# Patient Record
Sex: MALE | Race: WHITE
[De-identification: names, ages, dates, MRNs, and addresses within clinical notes are randomized per-mention and may not be internally consistent; named-entity substitution may affect disease eponyms.]

---

## 2018-10-04 ENCOUNTER — HOSPITAL ENCOUNTER (EMERGENCY)
Dept: HOSPITAL 62 - ER | Age: 33
LOS: 1 days | Discharge: LEFT BEFORE DISCHARGE | End: 2018-10-05
Payer: SELF-PAY

## 2018-10-04 VITALS — SYSTOLIC BLOOD PRESSURE: 140 MMHG | DIASTOLIC BLOOD PRESSURE: 83 MMHG

## 2018-10-04 DIAGNOSIS — F91.1: ICD-10-CM

## 2018-10-04 DIAGNOSIS — F19.10: ICD-10-CM
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
## 2018-10-04 DIAGNOSIS — F10.920: Primary | ICD-10-CM

## 2018-10-04 DIAGNOSIS — Z86.14: ICD-10-CM

## 2018-10-04 DIAGNOSIS — F17.200: ICD-10-CM

## 2018-10-04 PROCEDURE — 80307 DRUG TEST PRSMV CHEM ANLYZR: CPT

## 2018-10-04 PROCEDURE — 93005 ELECTROCARDIOGRAM TRACING: CPT

## 2018-10-04 PROCEDURE — 81001 URINALYSIS AUTO W/SCOPE: CPT

## 2018-10-04 PROCEDURE — 36415 COLL VENOUS BLD VENIPUNCTURE: CPT

## 2018-10-04 PROCEDURE — 85025 COMPLETE CBC W/AUTO DIFF WBC: CPT

## 2018-10-04 PROCEDURE — 80053 COMPREHEN METABOLIC PANEL: CPT

## 2018-10-04 PROCEDURE — 93010 ELECTROCARDIOGRAM REPORT: CPT

## 2018-10-04 NOTE — ER DOCUMENT REPORT
ED Medical Screen (RME)





- General


Chief Complaint: Alcohol Withdrawl


Stated Complaint: ETOH


Time Seen by Provider: 10/04/18 23:34


Notes: 





33-year-old male, chief complaint of alcohol dependence and abuse, 

methamphetamine dependence and abuse, and suicidal ideations.  States that he 

does get alcohol withdrawals including tremors and feeling sick.  Admits to 

drinking alcohol today.  When asked if he is suicidal, he states that "if I do 

kill myself I am using drugs, it is the best way to go".  Formally on anxiety 

medications, denies any current medications.


TRAVEL OUTSIDE OF THE [place] IN LAST 30 DAYS: No





- Related Data


Allergies/Adverse Reactions: 


 





No Known Drug Allergies Allergy (Verified 08/14/18 10:23)


 


bee stings Allergy (Uncoded 08/14/18 10:23)


 











Past Medical History


Renal/ Medical History: Denies: Hx Peritoneal Dialysis


Skin Medical History: Reports Hx MRSA


Traumatic Medical History: Reports: Hx Fractures - hand and nose


Past Surgical History: Reports: Hx Nose Surgery, Hx Orthopedic Surgery - R hand





- Immunizations


Immunizations up to date: Yes


Hx Diphtheria, Pertussis, Tetanus Vaccination: Yes





Physical Exam





- Vital signs


Vitals: 





 











Temp Pulse Resp BP Pulse Ox


 


 98.0 F   91   15   140/83 H  99 


 


 10/04/18 22:43  10/04/18 22:43  10/04/18 22:43  10/04/18 22:43  10/04/18 22:43














- General


General appearance: Other - Appears intoxicated, slurring some words, however 

walks with a steady gait.  Does not appear to be in any distress.





Course





- Vital Signs


Vital signs: 





 











Temp Pulse Resp BP Pulse Ox


 


 98.0 F   91   15   140/83 H  99 


 


 10/04/18 22:43  10/04/18 22:43  10/04/18 22:43  10/04/18 22:43  10/04/18 22:43

## 2018-10-05 LAB
ADD MANUAL DIFF: NO
ALBUMIN SERPL-MCNC: 3.5 G/DL (ref 3.5–5)
ALP SERPL-CCNC: 53 U/L (ref 38–126)
ALT SERPL-CCNC: 20 U/L (ref 21–72)
ANION GAP SERPL CALC-SCNC: 9 MMOL/L (ref 5–19)
APAP SERPL-MCNC: < 10 UG/ML (ref 10–30)
APPEARANCE UR: CLEAR
APTT PPP: (no result) S
AST SERPL-CCNC: 20 U/L (ref 17–59)
BARBITURATES UR QL SCN: NEGATIVE
BASOPHILS # BLD AUTO: 0.2 10^3/UL (ref 0–0.2)
BASOPHILS NFR BLD AUTO: 2.4 % (ref 0–2)
BILIRUB DIRECT SERPL-MCNC: 0.3 MG/DL (ref 0–0.4)
BILIRUB SERPL-MCNC: 0.3 MG/DL (ref 0.2–1.3)
BILIRUB UR QL STRIP: NEGATIVE
BUN SERPL-MCNC: 5 MG/DL (ref 7–20)
CALCIUM: 8.7 MG/DL (ref 8.4–10.2)
CHLORIDE SERPL-SCNC: 112 MMOL/L (ref 98–107)
CO2 SERPL-SCNC: 21 MMOL/L (ref 22–30)
EOSINOPHIL # BLD AUTO: 0.7 10^3/UL (ref 0–0.6)
EOSINOPHIL NFR BLD AUTO: 7.4 % (ref 0–6)
ERYTHROCYTE [DISTWIDTH] IN BLOOD BY AUTOMATED COUNT: 13.1 % (ref 11.5–14)
ETHANOL SERPL-MCNC: 239 MG/DL
GLUCOSE SERPL-MCNC: 90 MG/DL (ref 75–110)
GLUCOSE UR STRIP-MCNC: NEGATIVE MG/DL
HCT VFR BLD CALC: 45.4 % (ref 37.9–51)
HGB BLD-MCNC: 16 G/DL (ref 13.5–17)
KETONES UR STRIP-MCNC: NEGATIVE MG/DL
LYMPHOCYTES # BLD AUTO: 2.6 10^3/UL (ref 0.5–4.7)
LYMPHOCYTES NFR BLD AUTO: 26.5 % (ref 13–45)
MCH RBC QN AUTO: 33.7 PG (ref 27–33.4)
MCHC RBC AUTO-ENTMCNC: 35.3 G/DL (ref 32–36)
MCV RBC AUTO: 96 FL (ref 80–97)
METHADONE UR QL SCN: NEGATIVE
MONOCYTES # BLD AUTO: 0.5 10^3/UL (ref 0.1–1.4)
MONOCYTES NFR BLD AUTO: 5.2 % (ref 3–13)
NEUTROPHILS # BLD AUTO: 5.8 10^3/UL (ref 1.7–8.2)
NEUTS SEG NFR BLD AUTO: 58.5 % (ref 42–78)
NITRITE UR QL STRIP: NEGATIVE
PCP UR QL SCN: NEGATIVE
PH UR STRIP: 6 [PH] (ref 5–9)
PLATELET # BLD: 284 10^3/UL (ref 150–450)
POTASSIUM SERPL-SCNC: 3.6 MMOL/L (ref 3.6–5)
PROT SERPL-MCNC: 5.9 G/DL (ref 6.3–8.2)
PROT UR STRIP-MCNC: NEGATIVE MG/DL
RBC # BLD AUTO: 4.75 10^6/UL (ref 4.35–5.55)
SALICYLATES SERPL-MCNC: < 1 MG/DL (ref 2–20)
SODIUM SERPL-SCNC: 142.2 MMOL/L (ref 137–145)
SP GR UR STRIP: 1
TOTAL CELLS COUNTED % (AUTO): 100 %
URINE AMPHETAMINES SCREEN: NEGATIVE
URINE BENZODIAZEPINES SCREEN: NEGATIVE
URINE COCAINE SCREEN: (no result)
URINE MARIJUANA (THC) SCREEN: (no result)
UROBILINOGEN UR-MCNC: NEGATIVE MG/DL (ref ?–2)
WBC # BLD AUTO: 9.9 10^3/UL (ref 4–10.5)

## 2018-10-05 NOTE — EKG REPORT
SEVERITY:- ABNORMAL ECG -

SINUS RHYTHM

PROBABLE LEFT VENTRICULAR HYPERTROPHY

ST ELEV, PROBABLE NORMAL EARLY REPOL PATTERN

:

Confirmed by: Duglas Lara 05-Oct-2018 10:11:37

## 2018-10-05 NOTE — ER DOCUMENT REPORT
ED General





- General


Chief Complaint: Alcohol Withdrawl


Stated Complaint: ETOH


Time Seen by Provider: 10/04/18 23:34


Cannot obtain history due to: Intoxicated


Notes: 





Patient is a 33-year-old male with polysubstance abuse and dependence who 

presents requesting detox.  Although the patient states he is here looking for 

detox resources he admits that he is currently intoxicated.  He is somewhat 

belligerent with me on initial assessment, very agitated about having blood 

draws etc.  It is quite difficult to ascertain a clear history from him on 

initial assessment.  He is adamantly denying suicidal ideation, states he only 

said that to get attention and so that he could get into rehab.  Denies acute 

medical complaints.  States repeatedly "I do not know why they sent me to the 

hospital, I am not sick I am just trying to get into rehab".


TRAVEL OUTSIDE OF THE [place] IN LAST 30 DAYS: No





- Related Data


Allergies/Adverse Reactions: 


 





No Known Drug Allergies Allergy (Verified 08/14/18 10:23)


 


bee stings Allergy (Uncoded 08/14/18 10:23)


 











Past Medical History





- General


Information source: Patient





- Social History


Smoking Status: Current Every Day Smoker


Frequency of alcohol use: Heavy


Drug Abuse: Bath salts, Cocaine, Marijuana, Methamphetamine


Family History: Reviewed & Not Pertinent


Patient has suicidal ideation: No


Patient has homicidal ideation: No


Renal/ Medical History: Denies: Hx Peritoneal Dialysis


Skin Medical History: Reports Hx MRSA


Psychiatric Medical History: Reports: Hx Attention Deficit Hyperactivity 

Disorder, Hx Depression


Traumatic Medical History: Reports: Hx Fractures - hand and nose


Past Surgical History: Reports: Hx Nose Surgery, Hx Orthopedic Surgery - R hand





- Immunizations


Immunizations up to date: Yes


Hx Diphtheria, Pertussis, Tetanus Vaccination: Yes





Review of Systems





- Review of Systems


Notes: 





Constitutional: Negative for fever.


HENT: Negative for sore throat.


Eyes: Negative for visual changes.


Cardiovascular: Negative for chest pain.


Respiratory: Negative for shortness of breath.


Gastrointestinal: Negative for abdominal pain, vomiting or diarrhea.


Genitourinary: Negative for dysuria.


Musculoskeletal: Negative for back pain.


Skin: Negative for rash.


Neurological: Negative for headaches, weakness or numbness.





10 point ROS negative except as marked above and in HPI.





Physical Exam





- Vital signs


Vitals: 


 











Temp Pulse Resp BP Pulse Ox


 


 98.0 F   91   15   140/83 H  99 


 


 10/04/18 22:43  10/04/18 22:43  10/04/18 22:43  10/04/18 22:43  10/04/18 22:43











Notes: 





PHYSICAL EXAMINATION: Exam is limited by the patient's agitation





GENERAL: Intoxicated, somewhat agitated





HEAD: Atraumatic, normocephalic.





EYES:  extraocular movements intact, sclera anicteric, conjunctiva are normal.





ENT: nares patent, oropharynx clear without exudates.  Moderately dry mucous 

membranes.





NECK: Normal range of motion





LUNGS: Normal work of breathing





EXTREMITIES: Normal range of motion, no pitting or edema.  No cyanosis.





NEUROLOGICAL: No focal neurological deficits. Moves all extremities 

spontaneously





PSYCH: Agitated, speaking in a loud voice, threatening gestures





SKIN: Warm, Dry, normal turgor, no rashes or lesions noted.





Course





- Re-evaluation


Re-evalutation: 





10/05/18 01:45


Presentation of an intoxicated, alcohol patient who is belligerent, agitated, 

quite aggressive to me during contact.  He is very agitated that we proceeded 

to do a blood draw, had placed him on monitor, states he is here because he 

supposed to be going to rehab.  The patient was difficult to redirect, 

adamantly denied suicidal ideation, stated that he had said this because "that 

is the only fucking way you can get help these days".  He is demanding to leave

, states he does not wish to remain.  The patient is not having any evidence of 

alcohol withdrawal.  His alcohol is 239.  He will be discharged at his request, 

does not meet IVC criteria.








- Vital Signs


Vital signs: 


 











Temp Pulse Resp BP Pulse Ox


 


 98.0 F   91   15   140/83 H  99 


 


 10/04/18 22:43  10/04/18 22:43  10/04/18 22:43  10/04/18 22:43  10/04/18 22:43














- Laboratory


Result Diagrams: 


 10/05/18 00:10





 10/05/18 00:10


Laboratory results interpreted by me: 


 











  10/05/18 10/05/18





  00:10 00:10


 


MCH  33.7 H 


 


Eosinophils %  7.4 H 


 


Basophils %  2.4 H 


 


Absolute Eosinophils  0.7 H 


 


Chloride   112 H


 


Carbon Dioxide   21 L


 


BUN   5 L


 


ALT   20 L


 


Total Protein   5.9 L


 


Salicylates   < 1.0 L


 


Acetaminophen   < 10 L














Discharge





- Discharge


Clinical Impression: 


 Polysubstance abuse, Aggressive behavior





Alcohol intoxication


Qualifiers:


 Complication of substance-induced condition: uncomplicated Qualified Code(s): 

F10.920 - Alcohol use, unspecified with intoxication, uncomplicated





Disposition: ELOPED

## 2019-01-14 ENCOUNTER — HOSPITAL ENCOUNTER (EMERGENCY)
Dept: HOSPITAL 62 - ER | Age: 34
Discharge: HOME | End: 2019-01-14
Payer: SELF-PAY

## 2019-01-14 VITALS — SYSTOLIC BLOOD PRESSURE: 119 MMHG | DIASTOLIC BLOOD PRESSURE: 57 MMHG

## 2019-01-14 DIAGNOSIS — S67.193A: Primary | ICD-10-CM

## 2019-01-14 DIAGNOSIS — S67.195A: ICD-10-CM

## 2019-01-14 DIAGNOSIS — S67.197A: ICD-10-CM
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
## 2019-01-14 DIAGNOSIS — F17.200: ICD-10-CM

## 2019-01-14 DIAGNOSIS — Z86.14: ICD-10-CM

## 2019-01-14 PROCEDURE — 99283 EMERGENCY DEPT VISIT LOW MDM: CPT

## 2019-01-14 NOTE — ER DOCUMENT REPORT
HPI





- HPI


Patient complains to provider of: finger injury


Time Seen by Provider: 01/14/19 20:29


Pain Level: 5


Context: 





Patient is a 33-year-old male presents to the emergency department complaining 

of a left hand injury.  Patient states he was helping a friend move an air 

compressor when it accidentally slipped and fell down on his left middle finger,

left ring finger, and left pinky.


Patient states he has a prior injury to his left distal middle finger, suffering

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  

Patient states his tetanus is up-to-date.


Patient denies any other injuries





Past medical history: None


Medications: None


Allergies: None








- CONSTITUTIONAL


Constitutional: DENIES: Fever, Chills





- MUSCULOSKELETAL


Musculoskeletal: REPORTS: Extremity pain





Past Medical History





- General


Information source: Patient





- Social History


Smoking Status: Current Every Day Smoker


Chew tobacco use (# tins/day): No


Frequency of alcohol use: None


Drug Abuse: None


Family History: Reviewed & Not Pertinent


Patient has suicidal ideation: No


Patient has homicidal ideation: No


Renal/ Medical History: Denies: Hx Peritoneal Dialysis


Skin Medical History: Reports Hx MRSA


Psychiatric Medical History: Reports: Hx Attention Deficit Hyperactivity 

Disorder, Hx Depression


Traumatic Medical History: Reports: Hx Fractures - hand and nose


Past Surgical History: Reports: Hx Nose Surgery, Hx Orthopedic Surgery - R hand





- Immunizations


Immunizations up to date: Yes


Hx Diphtheria, Pertussis, Tetanus Vaccination: Yes





Vertical Provider Document





- CONSTITUTIONAL


Agree With Documented VS: Yes


Notes: 





GENERAL: Alert, interacts well. No acute distress.


HEAD: Normocephalic, atraumatic.


EYES: Pupils equal, round, and reactive to light. Extraocular movements intact.


ENT: Oral mucosa moist, tongue midline. 


NECK: Full range of motion. Supple. Trachea midline.


LUNGS: Clear to auscultation bilaterally, no wheezes, rales, or rhonchi. No 

respiratory distress.


HEART: Regular rate and rhythm. No murmur


ABDOMEN: Soft, non-tender. Non-distended. Bowel sounds present in all 4 

quadrants.


EXTREMITIES: Moves all 4 extremities spontaneously. No edema, normal radial and 

dorsalis pedis pulses bilaterally. No cyanosis.  Patient is able to adduct and 

abduct all 5 fingers on the left hand against resistance.  Patient also able to 

flex and extend all 5 fingers.  Patient does have a abrasion noted to the 

posterior DIP of the left middle finger.  Also 0.25 cm laceration noted near the

nailbed of the left pinky.


BACK: no cervical, thoracic, lumbar midline tenderness. No saddle anesthesia, 

normal distal neurovascular exam. 


NEUROLOGICAL: Alert and oriented x3. Normal speech. cranial nerves II through 

XII grossly intact 


PSYCH: Normal affect, normal mood.


SKIN: Warm, dry, normal turgor. No rashes or lesions noted.








- INFECTION CONTROL


TRAVEL OUTSIDE OF THE [place] IN LAST 30 DAYS: No





Course





- Re-evaluation


Re-evalutation: 





01/14/19 20:40


Patient's x-ray shows no signs of new injuries, no fractures or dislocations 

noted.


Discussed patient's past injury to his left distal middle finger which coincides

with his x-ray results.  Patient states his tetanus is up-to-date.


Order for wounds to be cleansed and dressed placed, discussed with RN staff, 

patient stable for discharge.











- Vital Signs


Vital signs: 


                                        











Temp Pulse Resp BP Pulse Ox


 


 98.3 F   104 H  16   119/57 L  96 


 


 01/14/19 18:33  01/14/19 18:33  01/14/19 18:33  01/14/19 18:33  01/14/19 18:33














Discharge





- Discharge


Clinical Impression: 


 Crush injury, Abrasion





Finger injury


Qualifiers:


 Encounter type: initial encounter Laterality: left Qualified Code(s): S69.92XA 

- Unspecified injury of left wrist, hand and finger(s), initial encounter





Condition: Stable


Disposition: HOME, SELF-CARE


Instructions:  Abrasions ([facility]), Crush Injury ([facility])


Additional Instructions: 


As we discussed you have been seen and treated for an injury to your left hand. 

Your x-rays revealed no signs of fractures at this time.  Your tetanus 

immunization is up-to-date so there is no need for tetanus vaccine at this time.

 Please keep the wound clean and dry.  Please follow-up with your primary care 

provider in the next 24-48 hours.  Please return to the emergency room for any 

other concerning symptoms.


Forms:  Return to Work

## 2019-01-14 NOTE — RADIOLOGY REPORT (SQ)
EXAM DESCRIPTION:  HAND LEFT 3 VIEWS



COMPLETED DATE/TIME:  1/14/2019 6:21 pm



REASON FOR STUDY:  Injury to L hand/ ring finger by compressor



COMPARISON:  None.



EXAM PARAMETERS:  NUMBER OF VIEWS: Three views.

TECHNIQUE: AP, lateral and oblique  radiographic images acquired of the left hand.

LIMITATIONS: None.



FINDINGS:  MINERALIZATION: Normal.

BONES: No acute fracture or dislocation.  No worrisome bone lesions.

JOINTS: There is deformity of the 3rd distal interphalangeal joint, possibly suggesting prior trauma.


SOFT TISSUES: No soft tissue swelling.  No foreign body.

OTHER: No other significant finding.



IMPRESSION:  There are changes in the 3rd distal interphalangeal joint felt likely to be related to p
rior trauma.  No acute osseous abnormality is seen.



TECHNICAL DOCUMENTATION:  JOB ID:  [id number]

 2011 [employer]- All Rights Reserved



Reading location - IP/workstation name: [email or web address]

## 2019-04-03 NOTE — ER DOCUMENT REPORT
ED Medical Screen (RME)





- General


Chief Complaint: Abdominal Pain


Stated Complaint: ABDOMINAL PAIN


Time Seen by Provider: 04/03/19 10:24


TRAVEL OUTSIDE OF THE [place] IN LAST 30 DAYS: No





- HPI


Notes: 





04/03/19 10:40


Patient is a 34-year-old male with no significant past medical history presents 

emergency department complaining of mid abdominal pain near his umbilicus that 

is described as relatively sharp with intermittent cramping that began over the 

past 2-3 days.  He has had associated nausea and vomiting initially, but just 

nausea now.  Patient states that he did have some loose stool yesterday, but 

nothing today.  Patient states that there is some burning when he urinates 

without any discharge.  No surgical history to his abdomen.  Denies any 

headache, fever, neck pain, URI, sore throat, chest pain, palpitations, syncope,

cough, shortness of breath, wheeze, dyspnea, urinary retention, hematuria, back 

pain, loss of control of bowel or bladder, numbness/tingling, saddle anesthesia,

muscle paralysis/weakness, or rash.





I have treated and performed a rapid initial assessment of this patient.  A 

comprehensive ED assessment and evaluation of the patient, analysis of test 

results and completion of medical decision making process will be conducted by 

additional ED providers.





PHYSICAL EXAMINATION:





GENERAL: Well-appearing, well-nourished and in no acute distress.  A&Ox4.  

Answers questions appropriately.





LUNGS: Breath sounds clear to auscultation bilaterally and equal.  No wheezes 

rales or rhonchi.





HEART: Regular rate and rhythm without murmurs, rubs, gallops.





ABDOMEN: Soft, nondistended abdomen.  No guarding, no rebound.  Normal bowel 

sounds present.  No CVA tenderness bilaterally. + tenderness mid abd, but 

difficult to assess in PIT room.





Extremities:  No cyanosis, clubbing, or edema b/l.  





NEUROLOGICAL: Normal speech, normal gait. 





PSYCH: Normal mood, normal affect.








- Related Data


Allergies/Adverse Reactions: 


                                        





No Known Drug Allergies Allergy (Verified 04/03/19 10:12)


   


bee stings Allergy (Uncoded 04/03/19 10:12)


   











Past Medical History


Renal/ Medical History: Denies: Hx Peritoneal Dialysis


Skin Medical History: Reports Hx MRSA


Psychiatric Medical History: Reports: Hx Attention Deficit Hyperactivity 

Disorder, Hx Depression


Traumatic Medical History: Reports: Hx Fractures - hand and nose


Past Surgical History: Reports: Hx Nose Surgery, Hx Orthopedic Surgery - R hand





- Immunizations


Immunizations up to date: Yes


Hx Diphtheria, Pertussis, Tetanus Vaccination: Yes





Physical Exam





- Vital signs


Vitals: 





                                        











Temp Pulse Resp BP Pulse Ox


 


 97.8 F   77   18   141/93 H  97 


 


 04/03/19 10:16  04/03/19 10:16  04/03/19 10:16  04/03/19 10:16  04/03/19 10:16














Course





- Vital Signs


Vital signs: 





                                        











Temp Pulse Resp BP Pulse Ox


 


 97.8 F   77   18   141/93 H  97 


 


 04/03/19 10:16  04/03/19 10:16  04/03/19 10:16  04/03/19 10:16  04/03/19 10:16

## 2019-04-03 NOTE — RADIOLOGY REPORT (SQ)
EXAM DESCRIPTION:  CT ABD/PELVIS WITH IV ONLY



COMPLETED DATE/TIME:  4/3/2019 12:53 pm



REASON FOR STUDY:  abdominal pain



COMPARISON:  None.



TECHNIQUE:  CT scan of the abdomen and pelvis performed using helical scanning technique with dynamic
 intravenous contrast injection.  No oral contrast. Images reviewed with lung, soft tissue, and bone 
windows. Reconstructed coronal and sagittal MPR images reviewed. Delayed images for evaluation of the
 urinary system also acquired. All images stored on PACS.

All CT scanners at this facility use dose modulation, iterative reconstruction, and/or weight based d
osing when appropriate to reduce radiation dose to as low as reasonably achievable (ALARA).

[facility]: Dose Right  [facility]: CareDose    [facility]: Dose Right    [facility]: Teradose 4D    [facility]: [employer] Technologies



CONTRAST TYPE AND DOSE:  contrast/concentration: Isovue 350.00 mg/ml; Total Contrast Delivered: 89.0 
ml; Total Saline Delivered: 70.0 ml



RENAL FUNCTION:  BUN 13, creatinine 0.79



RADIATION DOSE:  CT Rad equipment meets quality standard of care and radiation dose reduction techniq
ues were employed. CTDIvol: 5.7 - 7.9 mGy. DLP: 705 mGy-cm..



LIMITATIONS:  None.



FINDINGS:  LOWER CHEST: No significant findings. No nodules or infiltrates.

LIVER: Normal size. No masses.  No dilated ducts.

SPLEEN: Normal size. No focal lesions.

PANCREAS: No masses. No significant calcifications. No adjacent inflammation or peripancreatic fluid 
collections. Pancreatic duct not dilated.

GALLBLADDER: No identified stones by CT criteria. No inflammatory changes to suggest cholecystitis.

ADRENAL GLANDS: No significant masses or asymmetry.

RIGHT KIDNEY AND URETER: No solid masses.   No significant calcifications.   No hydronephrosis or hyd
roureter.

LEFT KIDNEY AND URETER: No solid masses.   No significant calcifications.   No hydronephrosis or hydr
oureter.

AORTA AND VESSELS: No aneurysm. No dissection. Renal arteries, SMA, celiac without stenosis.

RETROPERITONEUM: No retroperitoneal adenopathy, hemorrhage or masses.

BOWEL AND PERITONEAL CAVITY: No masses or inflammatory changes. No free fluid or peritoneal masses.

APPENDIX: Normal.

PELVIS: No mass.  No free fluid. Normal bladder.

ABDOMINAL WALL: No masses. No hernias.

BONES: Bone island within the proximal left femur.  No acute bony abnormality.  No suspicious osseous
 lesions.

OTHER: No other significant finding.



IMPRESSION:  No evidence of acute intra-abdominal/pelvic process.

Normal appendix.



TECHNICAL DOCUMENTATION:  JOB ID:  [id number]

Quality ID # [id number]: Final reports with documentation of one or more dose reduction techniques (e.g., Au
tomated exposure control, adjustment of the mA and/or kV according to patient size, use of iterative 
reconstruction technique)

 2011 [facility]- All Rights Reserved



Reading location - IP/workstation name: [facility]

## 2019-04-03 NOTE — ER DOCUMENT REPORT
ED General





- General


Chief Complaint: Abdominal Pain


Stated Complaint: ABDOMINAL PAIN


Time Seen by Provider: 04/03/19 10:24


Primary Care Provider: 


[facility] [Provider Group] - Follow up in 3-5 days


[facility] [Provider Group] - Follow up as needed


TRAVEL OUTSIDE OF THE [place] IN LAST 30 DAYS: No





- HPI


Notes: 





Patient is a 34-year-old male that presents to the emergency department for 

chief complaint of abdominal pain.


Patient reports periumbilical crampy abdominal pain that is intermittent in 

nature for the last 3-4 days.  He states he was having 1-2 episodes of emesis, 

and his last emesis was yesterday.  Patient also reports associated diarrhea.  

Patient states he is weaning himself off of alcohol.  He is a daily drinker and 

states he has had very minimal today.  He states he has had 3-4 shots of liquor 

yesterday and none so far today.  He denies history of alcohol withdrawal.  He 

states he would like to not be drinking alcohol anymore.  He denies any fever, 

chills or hallucinations.  He has not taken any medication at home for his 

symptoms.





Past Medical History: Negative





Past Surgical History: Negative





Social History: Daily tobacco, daily alcohol, denies drug use





Family History: Reviewed and noncontributory for presenting illness





Allergies: Reviewed, see documented allergy list.








REVIEW OF SYSTEMS:





CONSTITUTIONAL : 





No fever





No chills





No diaphoresis





No recent illness





EENT:





No vision changes





No congestion





No sore throat  





CARDIOVASCULAR:





No chest pain





No palpitations





RESPIRATORY:





No shortness of breath





No cough





No difficulty breathing





GASTROINTESTINAL: 





abdominal pain





nausea





vomiting





diarrhea





GENITOURINARY:





No dysuria





No hematuria





No difficulty urinating





MUSCULOSKELETAL:





No back pain





No leg pain





No arm pain





SKIN:  





No rashes





No lesions





LYMPHATIC: 





No swollen, enlarged glands.





NEUROLOGICAL: 





No lightheadedness





No headache





No weakness





No paresthesias





PSYCHIATRIC:





No anxiety





No depression 








PHYSICAL EXAMINATION:





Vital signs reviewed, nursing noted reviewed.





GENERAL: Well-appearing, well-nourished and in no acute distress.





HEAD: Atraumatic, normocephalic.





EYES: Eyes appear normal, extraocular movements intact, sclera anicteric, 

conjunctiva are normal.





ENT: nares patent, oropharynx clear without exudates.  Moist mucous membranes.





NECK: Normal range of motion, supple without lymphadenopathy





LUNGS: Breath sounds clear to auscultation bilaterally and equal.  No wheezes 

rales or rhonchi.





HEART: Regular rate and rhythm without murmurs





ABDOMEN: Soft, mild diffuse tenderness, protuberant, normoactive bowel sounds.  

No rebound, guarding, or rigidity. No masses appreciated.





EXTREMITIES: Nontender, good range of motion, no pitting or edema. 





NEUROLOGICAL: No focal neurological deficits. Moves all extremities 

spontaneously Motor and sensory grossly intact on exam.





PSYCH: Normal mood, normal affect.





SKIN: Warm, Dry, normal turgor, no rashes or lesions noted on exposed skin











- Related Data


Allergies/Adverse Reactions: 


                                        





No Known Drug Allergies Allergy (Verified 04/03/19 10:12)


   


bee stings Allergy (Uncoded 04/03/19 10:12)


   











Past Medical History





- Social History


Smoking Status: Current Every Day Smoker


Family History: Reviewed & Not Pertinent


Patient has suicidal ideation: No


Patient has homicidal ideation: No


Renal/ Medical History: Denies: Hx Peritoneal Dialysis


Skin Medical History: Reports Hx MRSA


Psychiatric Medical History: Reports: Hx Attention Deficit Hyperactivity 

Disorder, Hx Depression


Traumatic Medical History: Reports: Hx Fractures - hand and nose


Past Surgical History: Reports: Hx Nose Surgery, Hx Orthopedic Surgery - R hand





- Immunizations


Immunizations up to date: Yes


Hx Diphtheria, Pertussis, Tetanus Vaccination: Yes





Physical Exam





- Vital signs


Vitals: 


                                        











Temp Pulse Resp BP Pulse Ox


 


 97.8 F   77   18   141/93 H  97 


 


 04/03/19 10:16  04/03/19 10:16  04/03/19 10:16  04/03/19 10:16  04/03/19 10:16














Course





- Re-evaluation


Re-evalutation: 





04/03/19 12:10


Vitals reviewed.  Nursing notes reviewed.  Patient  felt improved after fluids 

and Zofran.  He has not had any vomiting in the ER.  He is still complaining of 

abdominal pain and was given a dose of Toradol.  Patient's lab work shows 

elevated lipase consistent with acute pancreatitis from his alcoholism.  The 

remainder of his blood work is unremarkable.  CT will be ordered to further 

evaluate patient's pancreas.  He is currently not tachycardic or tremulous to 

suggest acute alcohol withdrawal requiring treatment.





Laboratory











  04/03/19 04/03/19 04/03/19





  10:59 10:59 10:59


 


WBC  10.7 H  


 


RBC  5.24  


 


Hgb  17.9 H  


 


Hct  50.4  


 


MCV  96  


 


MCH  34.2 H  


 


MCHC  35.5  


 


RDW  13.4  


 


Plt Count  242  


 


Seg Neutrophils %  70.5  


 


Lymphocytes %  16.9  


 


Monocytes %  6.1  


 


Eosinophils %  5.6  


 


Basophils %  0.9  


 


Absolute Neutrophils  7.5  


 


Absolute Lymphocytes  1.8  


 


Absolute Monocytes  0.7  


 


Absolute Eosinophils  0.6  


 


Absolute Basophils  0.1  


 


Sodium   137.1 


 


Potassium   4.5 


 


Chloride   102 


 


Carbon Dioxide   25 


 


Anion Gap   10 


 


BUN   13 


 


Creatinine   0.79 


 


Est GFR ( Amer)   > 60 


 


Est GFR (Non-Af Amer)   > 60 


 


Glucose   89 


 


Calcium   9.7 


 


Total Bilirubin   0.5 


 


Direct Bilirubin   0.3 


 


Neonat Total Bilirubin   Not Reportable 


 


Neonat Direct Bilirubin   Not Reportable 


 


Neonat Indirect Bili   Not Reportable 


 


AST   31 


 


ALT   29 


 


Alkaline Phosphatase   83 


 


Total Protein   7.6 


 


Albumin   4.5 


 


Lipase   448.4 H 


 


Urine Color    YELLOW


 


Urine Appearance    CLEAR


 


Urine pH    7.0


 


Ur Specific Gravity    1.013


 


Urine Protein    NEGATIVE


 


Urine Glucose (UA)    NEGATIVE


 


Urine Ketones    NEGATIVE


 


Urine Blood    SMALL H


 


Urine Nitrite    NEGATIVE


 


Urine Bilirubin    NEGATIVE


 


Urine Urobilinogen    NEGATIVE


 


Ur Leukocyte Esterase    NEGATIVE


 


Urine WBC (Auto)    0


 


Urine RBC (Auto)    7


 


Urine Mucus (Auto)    RARE


 


Urine Ascorbic Acid    NEGATIVE











04/03/19 13:48


Patient reevaluated and clinically appears improved.  Patient reports he had 

significant symptom medic relief after Toradol.  He has been able to tolerate 

drinking water and Gatorade without emesis.  His CT scan shows no necrosis or 

abscess around the pancreas.  Patient does have a mild pancreatitis but is 

tolerating intake and his pain is controlled.  I had a long conversation 

regarding alcohol cessation.  Currently his alcohol levels are negative and he i

s not having any acute alcohol withdrawal symptoms.  He will follow with [facility] 

for further substance abuse management.  Patient counseled on return precautions

and verbalized understanding.  He was discharged in stable condition.





- Vital Signs


Vital signs: 


                                        











Temp Pulse Resp BP Pulse Ox


 


 97.8 F   77   18   141/93 H  97 


 


 04/03/19 10:16  04/03/19 10:16  04/03/19 10:16  04/03/19 10:16  04/03/19 10:16














- Laboratory


Result Diagrams: 


                                 04/03/19 10:59





                                 04/03/19 10:59


Laboratory results interpreted by me: 


                                        











  04/03/19 04/03/19 04/03/19





  10:59 10:59 10:59


 


WBC  10.7 H  


 


Hgb  17.9 H  


 


MCH  34.2 H  


 


Lipase   448.4 H 


 


Urine Blood    SMALL H














Discharge





- Discharge


Clinical Impression: 


 Elevated blood pressure reading, Alcohol abuse





Nausea and vomiting


Qualifiers:


 Vomiting type: unspecified Vomiting Intractability: non-intractable Qualified 

Code(s): R11.2 - Nausea with vomiting, unspecified





Abdominal pain


Qualifiers:


 Abdominal location: generalized Qualified Code(s): R10.84 - Generalized 

abdominal pain





Pancreatitis


Qualifiers:


 Chronicity: acute Pancreatitis type: alcohol induced Acute pancreatitis 

complication: unspecified Qualified Code(s): K85.20 - Alcohol induced acute 

pancreatitis without necrosis or infection





Condition: Stable


Disposition: HOME, SELF-CARE


Instructions:  Chronic Alcoholism ([facility]), Pancreatitis ([facility]), Vomiting ([facility])


Additional Instructions: 


Please return to the emergency department if you have any worsening, or concern 

of your symptoms.





Please return to the emergency department if you develop chest pain, difficulty 

breathing, severe abdominal pain, or ongoing vomiting.





Please follow-up with your primary care physician in 2-3 days and any other 

recommended physicians.





If prescribed, take all medications as directed. 





If you have any questions or concerns do not hesitate to return the emergency 

department for evaluation.





You need to have your blood pressure rechecked at your primary care doctor's 

office, it was elevated in the ER today.





Prescriptions: 


Ondansetron [Zofran Odt 4 mg Tablet] 1 tab PO Q4H PRN #15 tab.rapdis


 PRN Reason: For Nausea/Vomiting


Forms:  Elevated Blood Pressure


Referrals: 


[facility] [Provider Group] - Follow up in 3-5 days


[facility] [Provider Group] - Follow up as needed

## 2019-04-15 NOTE — ER DOCUMENT REPORT
ED General





- General


Chief Complaint: Lower Abdominal Pain


Stated Complaint: ABDOMINAL PAIN


Time Seen by Provider: 04/15/19 08:42


TRAVEL OUTSIDE OF THE [place] IN LAST 30 DAYS: No





- HPI


Notes: 





Patient is a 34-year-old male that presents to the emergency department for 

chief complaint of abdominal pain and dental ache.


Patient reports waking up this morning around 3 AM with a dental pain on his 

left mandibular molar.  He states he took 3 200 mg tablets of Motrin and 1 600 

mg tablet of Motrin at around 4 AM for the pain.  He states shortly after taking

the medication he began to have an epigastric discomfort.  He denies any 

radiation of the pain.  He describes it as sharp and constant.  He did take a 

Zofran and states that gave him some relief of the associated nausea.  He denies

any vomiting, fevers and diarrhea.  Patient has been weaning himself off of 

alcohol and reports he is down to 2-3 beers a day.  He has an appointment at 

[facility] for alcohol detox in the next week or so.  He was recently seen in the 

emergency room and had a mild pancreatitis and states after being seen here his 

symptoms had completely resolved prior to this morning.





Past Medical History: Alcohol dependence





Past Surgical History: Reviewed in chart





Social History: Daily alcohol.





Family History: Reviewed and noncontributory for presenting illness





Allergies: Reviewed, see documented allergy list.








REVIEW OF SYSTEMS:





CONSTITUTIONAL : 





No fever





No chills





No diaphoresis





No recent illness





EENT:





Dental pain





No vision changes





No congestion





No sore throat  





CARDIOVASCULAR:





No chest pain





No palpitations





RESPIRATORY:





No shortness of breath





No cough





No difficulty breathing





GASTROINTESTINAL: 





 abdominal pain





nausea





No vomiting





No diarrhea





GENITOURINARY:





No dysuria





No hematuria





No difficulty urinating





MUSCULOSKELETAL:





No back pain





No leg pain





No arm pain





SKIN:  





No rashes





No lesions





LYMPHATIC: 





No swollen, enlarged glands.





NEUROLOGICAL: 





No lightheadedness





No headache





No weakness





No paresthesias





PSYCHIATRIC:





No anxiety





No depression 








PHYSICAL EXAMINATION:





Vital signs reviewed, nursing noted reviewed.





GENERAL: Well-appearing, well-nourished and in no acute distress.





HEAD: Atraumatic, normocephalic.





EYES: Eyes appear normal, extraocular movements intact, sclera anicteric, 

conjunctiva are normal.





ENT: Tenderness to percussion of posterior molar on right mandibular side with 

mild surrounding gingival erythema, no areas of fluctuance or edema.  Nares 

patent, oropharynx clear without exudates.  Moist mucous membranes.





NECK: Normal range of motion, supple without lymphadenopathy





LUNGS: Breath sounds clear to auscultation bilaterally and equal.  No wheezes 

rales or rhonchi.





HEART: Regular rate and rhythm without murmurs





ABDOMEN: Soft, mild epigastric tenderness, normoactive bowel sounds.  No 

rebound, guarding, or rigidity. No masses appreciated.





EXTREMITIES: Nontender, good range of motion, no pitting or edema. 





NEUROLOGICAL: No focal neurological deficits. Moves all extremities 

spontaneously Motor and sensory grossly intact on exam.





PSYCH: Normal mood, normal affect.





SKIN: Warm, Dry, normal turgor, no rashes or lesions noted on exposed skin











- Related Data


Allergies/Adverse Reactions: 


                                        





No Known Drug Allergies Allergy (Verified 04/15/19 07:52)


   


bee stings Allergy (Uncoded 04/15/19 07:52)


   











Past Medical History





- Social History


Smoking Status: Current Every Day Smoker


Family History: Reviewed & Not Pertinent


Renal/ Medical History: Denies: Hx Peritoneal Dialysis


Skin Medical History: Reports Hx MRSA


Psychiatric Medical History: Reports: Hx Attention Deficit Hyperactivity 

Disorder, Hx Depression


Traumatic Medical History: Reports: Hx Fractures - hand and nose


Past Surgical History: Reports: Hx Nose Surgery, Hx Orthopedic Surgery - R hand





- Immunizations


Immunizations up to date: Yes


Hx Diphtheria, Pertussis, Tetanus Vaccination: Yes





Physical Exam





- Vital signs


Vitals: 


                                        











Temp Pulse Resp BP Pulse Ox


 


 97.5 F   85   20   138/83 H  98 


 


 04/15/19 07:57  04/15/19 07:57  04/15/19 07:57  04/15/19 07:57  04/15/19 07:57














Course





- Re-evaluation


Re-evalutation: 





04/15/19 08:58


Vitals reviewed.  Nursing notes reviewed.  Patient is afebrile and nontoxic.  He

took Zofran just prior to arrival and did have some improvement.  Patient took 

1200 mg of Motrin which may be the cause of his epigastric discomfort.  He was 

given a GI cocktail for symptomatic management.  He does have a history of 

recent pancreatitis and is continuing to use alcohol daily although he has been 

doing a good job of weaning himself off.  Lab work will be obtained to evaluate 

for pancreatitis and electrolyte issues.  Patient will be started on penicillin 

for his dental infection.  There is currently no signs of dental abscess 

requiring drainage.


04/15/19 10:50


Patient's lab work appears normal.  He did get symptomatic relief after GI 

cocktail.  He has no acute pancreatitis.  He will be discharged home in stable 

condition.  He was encouraged to continue weaning off alcohol and follow with 

[facility] for detox.  He will be started on omeprazole.  He was counseled on NSAID 

use.  He is stable at discharge.





Laboratory











  04/15/19 04/15/19





  09:15 09:15


 


WBC  9.5 


 


RBC  4.63 


 


Hgb  15.7 


 


Hct  44.3 


 


MCV  96 


 


MCH  33.8 H 


 


MCHC  35.3 


 


RDW  13.0 


 


Plt Count  239 


 


Seg Neutrophils %  68.3 


 


Lymphocytes %  18.5 


 


Monocytes %  6.6 


 


Eosinophils %  6.0 


 


Basophils %  0.6 


 


Absolute Neutrophils  6.5 


 


Absolute Lymphocytes  1.7 


 


Absolute Monocytes  0.6 


 


Absolute Eosinophils  0.6 


 


Absolute Basophils  0.1 


 


Sodium   139.3


 


Potassium   4.5


 


Chloride   113 H


 


Carbon Dioxide   22


 


Anion Gap   4 L


 


BUN   12


 


Creatinine   0.80


 


Est GFR ( Amer)   > 60


 


Est GFR (Non-Af Amer)   > 60


 


Glucose   96


 


Calcium   9.2


 


Total Bilirubin   0.4


 


Direct Bilirubin   0.3


 


Neonat Total Bilirubin   Not Reportable


 


Neonat Direct Bilirubin   Not Reportable


 


Neonat Indirect Bili   Not Reportable


 


AST   34


 


ALT   19 L


 


Alkaline Phosphatase   68


 


Total Protein   6.6


 


Albumin   3.6


 


Lipase   161.1














- Vital Signs


Vital signs: 


                                        











Temp Pulse Resp BP Pulse Ox


 


 97.5 F   85   20   138/83 H  98 


 


 04/15/19 07:57  04/15/19 07:57  04/15/19 07:57  04/15/19 07:57  04/15/19 07:57














- Laboratory


Result Diagrams: 


                                 04/15/19 09:15





                                 04/15/19 09:15


Laboratory results interpreted by me: 


                                        











  04/15/19 04/15/19





  09:15 09:15


 


MCH  33.8 H 


 


Chloride   113 H


 


Anion Gap   4 L


 


ALT   19 L














Discharge





- Discharge


Clinical Impression: 


 Abdominal pain, acute, epigastric, Dental infection





Condition: Stable


Disposition: HOME, SELF-CARE


Instructions:  Abdominal Pain ([facility]), Dental Infection or Abscess ([facility])


Additional Instructions: 


Please return to the emergency department if you have any worsening, or concern 

of your symptoms.





Please return to the emergency department if you develop chest pain, difficulty 

breathing, severe abdominal pain, or ongoing vomiting.





Please follow-up with your primary care physician in 2-3 days and any other 

recommended physicians.





If prescribed, take all medications as directed. 





If you have any questions or concerns do not hesitate to return the emergency 

department for evaluation.





[]





Prescriptions: 


Omeprazole 40 mg PO DAILY #30 capsule.


Penicillin V Potassium [Penicillin Vk 500 mg Tablet] 500 mg PO BID #20 tablet


Referrals: 


[facility] [Provider Group] - Follow up as needed

## 2020-07-16 NOTE — RADIOLOGY REPORT (SQ)
EXAM DESCRIPTION:  WRIST RIGHT 3 VIEWS



IMAGES COMPLETED DATE/TIME:  7/16/2020 1:20 pm



REASON FOR STUDY:  punched wall last night



COMPARISON:  None.



NUMBER OF VIEWS:  Three views.



TECHNIQUE:  AP, lateral, and oblique radiographic images acquired of the right wrist.



LIMITATIONS:  None.



FINDINGS:  MINERALIZATION: Normal.

BONES:  Nondisplaced intra-articular distal radius fracture.  Soft tissue swelling.

Metallic plate with four screws fourth metacarpal bone.  Nonunited ulnar styloid versus old remote tr
auma.

SOFT TISSUES: No soft tissue swelling.  No foreign body.

OTHER: No other significant finding.



IMPRESSION:  1.  Nondisplaced intra-articular distal radius fracture.



COMMENT:  1.  The results of this examination were discussed with emergency department extender on 7/
16/2020 at 13:40 hours.



TECHNICAL DOCUMENTATION:  JOB ID:  [id number]

 2011 [employer]- All Rights Reserved



Reading location - IP/workstation name: [email or web address]

## 2020-07-16 NOTE — RADIOLOGY REPORT (SQ)
EXAM DESCRIPTION:  HAND RIGHT 3 VIEWS



IMAGES COMPLETED DATE/TIME:  7/16/2020 1:20 pm



REASON FOR STUDY:  punched wall last night



COMPARISON:  None.



EXAM PARAMETERS:  NUMBER OF VIEWS: Three views.

TECHNIQUE: AP, lateral and oblique  radiographic images acquired of the right hand.

LIMITATIONS: None.



FINDINGS:  MINERALIZATION: Normal.

BONES:  Mild deformity fifth metacarpal maybe related prior remote injury.  Metallic plate with screw
s fourth metacarpal bone.  Nondisplaced distal intra-articular radius fracture.

JOINTS: No effusions.

SOFT TISSUES: No soft tissue swelling.  No foreign body.

OTHER: No other significant finding.



IMPRESSION:  1.  Nondisplaced distal intra-articular radius fracture.



TECHNICAL DOCUMENTATION:  JOB ID:  [id number]

 2011 [employer]- All Rights Reserved



Reading location - IP/workstation name: [email or web address]

## 2020-07-17 NOTE — ER DOCUMENT REPORT
HPI





- HPI


Time Seen by Provider: 07/17/20 21:59


Pain Level: 5


Notes: 





35-year-old male patient presents emergency department chief complaint of right 

wrist pain.  Patient reports he was seen here diagnosed with a fracture in his 

wrist.  Patient reports he tried calling orthopedics this morning and they were 

unable to see him because they were closed.  Patient reports he is having 

continued pain.  Patient denies any new injury but states he does not know who 

to see for follow-up.  He has a splint in place.








- REPRODUCTIVE


Reproductive: DENIES: Pregnant:





Past Medical History





- General


Information source: Patient





- Social History


Smoking Status: Current Every Day Smoker


Chew tobacco use (# tins/day): No


Frequency of alcohol use: Occasional


Drug Abuse: None


Family History: Reviewed & Not Pertinent


Patient has homicidal ideation: No


Musculoskeletal Medical History: Reports Hx Musculoskeletal Trauma


Skin Medical History: Reports Hx MRSA


Psychiatric Medical History: Reports: Hx Attention Deficit Hyperactivity 

Disorder, Hx Depression


Traumatic Medical History: Reports: Hx Fractures - hand and nose and arm and 

elbow


Past Surgical History: Reports: Hx Nose Surgery, Hx Orthopedic Surgery - R hand





- Immunizations


Immunizations up to date: Yes


Hx Diphtheria, Pertussis, Tetanus Vaccination: Yes





[employer] Provider Document





- CONSTITUTIONAL


Notes: 





PHYSICAL EXAMINATION:





GENERAL: Well-appearing, well-nourished and in no acute distress.





HEAD: Atraumatic, normocephalic.





EYES: Pupils equal round extraocular movements intact,  conjunctiva are normal.





ENT: Nares patent





NECK: Normal range of motion





LUNGS: No respiratory distress





Musculoskeletal: Splint in place to the right arm, cap refill less than 3 

seconds, strong radial pulse.  Skin Pink and dry.





NEUROLOGICAL:  Normal speech, normal gait. 





PSYCH: Normal mood, normal affect.





SKIN: Warm, Dry, normal turgor, no rashes or lesions noted.





- INFECTION CONTROL


TRAVEL OUTSIDE OF THE [place] IN LAST 30 DAYS: No





Course





- Re-evaluation


Re-evalutation: 





Patient provided Vicodin dispense pack.  Patient given additional numbers for 

Ortho follow-up.  Patient verbalized understanding agreement this plan.  Patient

understands keeping splint in place until seen by orthopedics.  Splint appears 

to be well fitted.





- Vital Signs


Vital signs: 


                                        











Temp Pulse Resp BP Pulse Ox


 


 98.4 F   88   20   113/70   98 


 


 07/17/20 22:00  07/17/20 21:10  07/17/20 21:10  07/17/20 21:10  07/17/20 21:10














Discharge





- Discharge


Clinical Impression: 


 Right wrist pain





Condition: Stable


Disposition: HOME, SELF-CARE


Additional Instructions: 


Please continue to take ibuprofen, follow the directions on the bottle.  Take 

the Norco as needed for severe pain only.  Follow-up with orthopedics on Monday.





Referrals: 


JANN WEI DO [ACTIVE STAFF] - Follow up as needed


LOTUS RESENDEZ MD [ACTIVE STAFF] - Follow up as needed


JR THORNTON JR, DO [ACTIVE PROVISIONAL STAFF] - Follow up as needed
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
